# Patient Record
Sex: MALE | Race: AMERICAN INDIAN OR ALASKA NATIVE
[De-identification: names, ages, dates, MRNs, and addresses within clinical notes are randomized per-mention and may not be internally consistent; named-entity substitution may affect disease eponyms.]

---

## 2020-04-21 ENCOUNTER — HOSPITAL ENCOUNTER (EMERGENCY)
Dept: HOSPITAL 5 - ED | Age: 40
Discharge: LEFT BEFORE DISCHARGE | End: 2020-04-21
Payer: COMMERCIAL

## 2020-04-21 VITALS — DIASTOLIC BLOOD PRESSURE: 89 MMHG | SYSTOLIC BLOOD PRESSURE: 127 MMHG

## 2020-04-21 DIAGNOSIS — Z88.0: ICD-10-CM

## 2020-04-21 DIAGNOSIS — M79.604: Primary | ICD-10-CM

## 2020-04-21 DIAGNOSIS — Z91.013: ICD-10-CM

## 2020-04-21 DIAGNOSIS — I10: ICD-10-CM

## 2020-04-21 DIAGNOSIS — F17.200: ICD-10-CM

## 2020-04-21 DIAGNOSIS — M79.605: ICD-10-CM

## 2020-04-21 DIAGNOSIS — Z79.899: ICD-10-CM

## 2020-04-21 PROCEDURE — 99282 EMERGENCY DEPT VISIT SF MDM: CPT

## 2020-04-21 NOTE — EMERGENCY DEPARTMENT REPORT
ED Lower Extremity HPI





- General


Chief Complaint: Extremity Injury, Lower


Stated Complaint: BILATERAL LEG PAIN


Time Seen by Provider: 20 03:16


Source: patient


Mode of arrival: Ambulatory


Limitations: No Limitations





- History of Present Illness


Initial Comments: 


pt complains of bilat thigh pain after working out and heavy lifting 1 week ago,

described 3/10 pain and soreness. pain exacerbated palpation and performing work

duties. symptoms relieved by rest. pt denies fall injury or trauma. 





MD Complaint: leg injury


Onset/Timin


-: week(s)


Injury: Thigh: Right, Left


Type of Injury: hyperextension, other (overuse )


Place: home


Severity: mild


Severity scale (0 -10): 3


Improves With: rest


Worsens With: movement, palpation


Context: other (lifting )


Associated Symptoms: ambulatory.  denies: snap/pop sensation, swelling, 

numbness, tingling





- Related Data


                                Home Medications











 Medication  Instructions  Recorded  Confirmed  Last Taken


 


Atorvastatin Calcium [Lipitor] 20 mg PO QHS 14








                                  Previous Rx's











 Medication  Instructions  Recorded  Last Taken  Type


 


Metoprolol [Lopressor TAB] 25 mg PO BID #60 tablet 14 Unknown Rx


 


Menthol/Camphor [Tiger Balm 1 applicatio TP QID PRN #1 tube 20 Unknown Rx





Ointment]    


 


Naproxen 500 mg PO BID PRN #30 tablet 20 Unknown Rx











                                    Allergies











Allergy/AdvReac Type Severity Reaction Status Date / Time


 


Penicillins Allergy  Unknown Verified 14 00:31


 


shellfish derived Allergy  Shortness Verified 14 00:31





   of Breath  














ED Review of Systems


ROS: 


Stated complaint: BILATERAL LEG PAIN


Other details as noted in HPI





Constitutional: denies: chills, fever


Eyes: denies: eye pain, eye discharge, vision change


ENT: denies: ear pain, throat pain


Respiratory: denies: cough, shortness of breath, wheezing


Cardiovascular: denies: chest pain, palpitations


Endocrine: no symptoms reported


Gastrointestinal: denies: abdominal pain, nausea, diarrhea


Genitourinary: denies: urgency, dysuria


Musculoskeletal: denies: back pain, joint swelling, arthralgia


Skin: denies: rash, lesions


Neurological: denies: headache, weakness, paresthesias


Psychiatric: as per HPI


Hematological/Lymphatic: denies: easy bleeding, easy bruising





ED Past Medical Hx





- Past Medical History


Previous Medical History?: Yes


Hx Hypertension: Yes (not reg on meds)


Additional medical history: etoh abuse





- Surgical History


Past Surgical History?: Yes


Additional Surgical History: dental extraction today 14





- Social History


Smoking Status: Current Every Day Smoker


Substance Use Type: Alcohol





- Medications


Home Medications: 


                                Home Medications











 Medication  Instructions  Recorded  Confirmed  Last Taken  Type


 


Atorvastatin Calcium [Lipitor] 20 mg PO QHS 14 History


 


Metoprolol [Lopressor TAB] 25 mg PO BID #60 tablet 14  Unknown Rx


 


Menthol/Camphor [Tiger Balm 1 applicatio TP QID PRN #1 tube 20  Unknown Rx





Ointment]     


 


Naproxen 500 mg PO BID PRN #30 tablet 20  Unknown Rx














ED Physical Exam





- General


Limitations: No Limitations


General appearance: alert, in no apparent distress





- Head


Head exam: Present: atraumatic, normocephalic





- Eye


Eye exam: Present: normal appearance, PERRL, EOMI


Pupils: Present: normal accommodation





- ENT


ENT exam: Present: mucous membranes moist





- Neck


Neck exam: Present: normal inspection, full ROM.  Absent: tenderness





- Respiratory


Respiratory exam: Present: normal lung sounds bilaterally.  Absent: respiratory 

distress, wheezes, stridor, chest wall tenderness





- Cardiovascular


Cardiovascular Exam: Present: regular rate, normal rhythm, normal heart sounds. 

Absent: systolic murmur, diastolic murmur, rubs, gallop





- GI/Abdominal


GI/Abdominal exam: Present: soft, normal bowel sounds





- Rectal


Rectal exam: Present: deferred





- Extremities Exam


Extremities exam: Present: normal inspection, full ROM, tenderness (mild 

reproducible muscular tenderness to deep palpation).  Absent: pedal edema, joint

swelling, calf tenderness





- Back Exam


Back exam: Present: normal inspection, full ROM.  Absent: tenderness





- Neurological Exam


Neurological exam: Present: alert, oriented X3, CN II-XII intact, normal gait, 

reflexes normal.  Absent: motor sensory deficit





- Expanded Neurological Exam


  ** Expanded


Patient oriented to: Present: person, place, time


Speech: Present: fluid speech


Motor strength exam: RUE: 5, LUE: 5, RLE: 5, LLE: 5


Best Eye Response ([place]): (4) open spontaneously


Best Motor Response ([place]): (6) obeys commands


Best Verbal Response ([place]): (5) oriented


[place] Total: 15





- Psychiatric


Psychiatric exam: Present: normal affect, normal mood





- Skin


Skin exam: Present: warm, dry, intact, normal color.  Absent: rash





ED Course





                                   Vital Signs











  20





  00:31 01:50


 


Temperature 98.4 F 98.5 F


 


Pulse Rate 122 H 108 H


 


Respiratory 16 18





Rate  


 


Blood Pressure 146/93 


 


Blood Pressure  127/89





[Right]  


 


O2 Sat by Pulse 97 98





Oximetry  














ED Lower Extremity MDM





- Medical Decision Making


pt declines labs or imaging , he is currently a/o x 3, ambulatory with steady 

gait , denies pain at this time, plan: nsaids, analgesic balm., moist heat 

therapy follow up with pcp in 2-3 days. pt verbalized agreement and agreement 

with discharge plan. 





Critical care attestation.: 


If time is entered above; I have spent that time in minutes in the direct care 

of this critically ill patient, excluding procedure time.








ED Disposition


Clinical Impression: 


Musculoskeletal pain of lower extremity


Qualifiers:


 Laterality: unspecified laterality Qualified Code(s): M79.606 - Pain in leg, 

unspecified





Disposition: DC-01 TO HOME OR SELFCARE


Is pt being admited?: No


Does the pt Need Aspirin: No


Condition: Stable


Instructions:  Musculoskeletal Pain (ED)


Prescriptions: 


Naproxen 500 mg PO BID PRN #30 tablet


 PRN Reason: pain


Menthol/Camphor [Tiger Balm Ointment] 1 applicatio TP QID PRN #1 tube


 PRN Reason: pain


Referrals: 


JOSE G GUILLAUME MD [Staff Physician] - 3-5 Days


Forms:  Work/School Release Form(ED)


Time of Disposition: 04:04